# Patient Record
Sex: MALE | Race: WHITE
[De-identification: names, ages, dates, MRNs, and addresses within clinical notes are randomized per-mention and may not be internally consistent; named-entity substitution may affect disease eponyms.]

---

## 2019-06-27 ENCOUNTER — HOSPITAL ENCOUNTER (EMERGENCY)
Dept: HOSPITAL 53 - M ED | Age: 59
Discharge: HOME | End: 2019-06-27
Payer: OTHER GOVERNMENT

## 2019-06-27 VITALS — BODY MASS INDEX: 35.87 KG/M2 | WEIGHT: 250.53 LBS | HEIGHT: 70 IN

## 2019-06-27 VITALS — SYSTOLIC BLOOD PRESSURE: 148 MMHG | DIASTOLIC BLOOD PRESSURE: 80 MMHG

## 2019-06-27 DIAGNOSIS — E78.5: ICD-10-CM

## 2019-06-27 DIAGNOSIS — I82.5Z2: ICD-10-CM

## 2019-06-27 DIAGNOSIS — Z79.899: ICD-10-CM

## 2019-06-27 DIAGNOSIS — I73.9: Primary | ICD-10-CM

## 2019-06-27 DIAGNOSIS — L03.116: ICD-10-CM

## 2019-06-27 DIAGNOSIS — Z79.01: ICD-10-CM

## 2019-06-27 DIAGNOSIS — I10: ICD-10-CM

## 2019-06-27 DIAGNOSIS — I83.008: ICD-10-CM

## 2019-06-27 LAB
APTT BLD: 40.4 SECONDS (ref 25–38.4)
BASOPHILS # BLD AUTO: 0 10^3/UL (ref 0–0.2)
BASOPHILS NFR BLD AUTO: 0.3 % (ref 0–1)
BUN SERPL-MCNC: 15 MG/DL (ref 7–18)
CALCIUM SERPL-MCNC: 8.2 MG/DL (ref 8.5–10.1)
CHLORIDE SERPL-SCNC: 109 MEQ/L (ref 98–107)
CO2 SERPL-SCNC: 27 MEQ/L (ref 21–32)
CREAT SERPL-MCNC: 0.98 MG/DL (ref 0.7–1.3)
CRP SERPL-MCNC: 1.3 MG/DL (ref 0–0.3)
EOSINOPHIL # BLD AUTO: 0.1 10^3/UL (ref 0–0.5)
EOSINOPHIL NFR BLD AUTO: 1 % (ref 0–3)
ERYTHROCYTE [SEDIMENTATION RATE] IN BLOOD BY WESTERGREN METHOD: 27 MM/HR (ref 0–20)
GFR SERPL CREATININE-BSD FRML MDRD: > 60 ML/MIN/{1.73_M2} (ref 56–?)
GLUCOSE SERPL-MCNC: 174 MG/DL (ref 70–100)
HCT VFR BLD AUTO: 40.9 % (ref 42–52)
HGB BLD-MCNC: 13.9 G/DL (ref 13.5–17.5)
INR PPP: 2.62
LYMPHOCYTES # BLD AUTO: 1.4 10^3/UL (ref 1.5–4.5)
LYMPHOCYTES NFR BLD AUTO: 20 % (ref 24–44)
MCH RBC QN AUTO: 32.1 PG (ref 27–33)
MCHC RBC AUTO-ENTMCNC: 34 G/DL (ref 32–36.5)
MCV RBC AUTO: 94.5 FL (ref 80–96)
MONOCYTES # BLD AUTO: 0.6 10^3/UL (ref 0–0.8)
MONOCYTES NFR BLD AUTO: 9.2 % (ref 0–5)
NEUTROPHILS # BLD AUTO: 4.7 10^3/UL (ref 1.8–7.7)
NEUTROPHILS NFR BLD AUTO: 69.1 % (ref 36–66)
PLATELET # BLD AUTO: 278 10^3/UL (ref 150–450)
POTASSIUM SERPL-SCNC: 3.9 MEQ/L (ref 3.5–5.1)
PROTHROMBIN TIME: 27.9 SECONDS (ref 11.8–14)
RBC # BLD AUTO: 4.33 10^6/UL (ref 4.3–6.1)
SODIUM SERPL-SCNC: 141 MEQ/L (ref 136–145)
WBC # BLD AUTO: 6.8 10^3/UL (ref 4–10)

## 2019-06-27 NOTE — REP
Clinical:  Left lower extremity swelling.

 

Technique:  Real time gray scale and color Doppler evaluation using linear high

frequency transducer.

 

Findings:

Nonocclusive thrombus is identified extending from the common femoral vein to the

popliteal vein along with occlusive thrombus in the duplicated mid to distal

femoral vein.  These findings are nonspecific and the patient gives a history of

current treatment for chronic thrombus.

 

Impression:

Elements of thrombus noted from the common femoral vein to the popliteal vein

which are nonspecific in appearance and may represent chronic thrombus.  The

patient gives a history of current treatment for DVT.

 

 

Electronically Signed by

Grant Mckeon MD 06/27/2019 03:13 P

## 2019-07-01 NOTE — ED PDOC
Post-Departure Follow-Up


jad tyler faxed formal report of extrem us for fu mlg Lundborg-Gray,Maja MD           Jul 1, 2019 14:29

## 2020-01-07 ENCOUNTER — HOSPITAL ENCOUNTER (OUTPATIENT)
Dept: HOSPITAL 53 - M IRPOV | Age: 60
End: 2020-01-07
Attending: RADIOLOGY
Payer: OTHER GOVERNMENT

## 2020-01-07 VITALS — HEIGHT: 70 IN | BODY MASS INDEX: 35.85 KG/M2 | WEIGHT: 250.45 LBS

## 2020-01-07 VITALS — SYSTOLIC BLOOD PRESSURE: 158 MMHG | DIASTOLIC BLOOD PRESSURE: 74 MMHG

## 2020-01-07 DIAGNOSIS — Z79.01: ICD-10-CM

## 2020-01-07 DIAGNOSIS — Z86.718: ICD-10-CM

## 2020-01-07 DIAGNOSIS — I87.392: Primary | ICD-10-CM
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
## 2020-01-15 ENCOUNTER — HOSPITAL ENCOUNTER (OUTPATIENT)
Dept: HOSPITAL 53 - M IRPRO | Age: 60
Setting detail: OBSERVATION
LOS: 1 days | Discharge: HOME | End: 2020-01-16
Attending: RADIOLOGY | Admitting: RADIOLOGY
Payer: OTHER GOVERNMENT

## 2020-01-15 VITALS — SYSTOLIC BLOOD PRESSURE: 162 MMHG | DIASTOLIC BLOOD PRESSURE: 75 MMHG

## 2020-01-15 VITALS — DIASTOLIC BLOOD PRESSURE: 59 MMHG | SYSTOLIC BLOOD PRESSURE: 104 MMHG

## 2020-01-15 VITALS — SYSTOLIC BLOOD PRESSURE: 132 MMHG | DIASTOLIC BLOOD PRESSURE: 75 MMHG

## 2020-01-15 VITALS — DIASTOLIC BLOOD PRESSURE: 65 MMHG | SYSTOLIC BLOOD PRESSURE: 120 MMHG

## 2020-01-15 VITALS — SYSTOLIC BLOOD PRESSURE: 136 MMHG | DIASTOLIC BLOOD PRESSURE: 79 MMHG

## 2020-01-15 VITALS — SYSTOLIC BLOOD PRESSURE: 111 MMHG | DIASTOLIC BLOOD PRESSURE: 53 MMHG

## 2020-01-15 DIAGNOSIS — E34.0: Primary | ICD-10-CM

## 2020-01-15 DIAGNOSIS — I82.422: ICD-10-CM

## 2020-01-15 DIAGNOSIS — I87.392: ICD-10-CM

## 2020-01-15 DIAGNOSIS — I82.402: ICD-10-CM

## 2020-01-15 LAB
ALBUMIN SERPL BCG-MCNC: 3.8 GM/DL (ref 3.2–5.2)
ALT SERPL W P-5'-P-CCNC: 41 U/L (ref 12–78)
BILIRUB SERPL-MCNC: 0.9 MG/DL (ref 0.2–1)
BUN SERPL-MCNC: 17 MG/DL (ref 7–18)
CALCIUM SERPL-MCNC: 8.9 MG/DL (ref 8.5–10.1)
CHLORIDE SERPL-SCNC: 109 MEQ/L (ref 98–107)
CO2 SERPL-SCNC: 26 MEQ/L (ref 21–32)
CREAT SERPL-MCNC: 0.88 MG/DL (ref 0.7–1.3)
GFR SERPL CREATININE-BSD FRML MDRD: > 60 ML/MIN/{1.73_M2} (ref 56–?)
GLUCOSE SERPL-MCNC: 105 MG/DL (ref 70–100)
HCT VFR BLD AUTO: 44 % (ref 42–52)
HGB BLD-MCNC: 14.5 G/DL (ref 13.5–17.5)
INR PPP: 1.52
MCH RBC QN AUTO: 31.4 PG (ref 27–33)
MCHC RBC AUTO-ENTMCNC: 33 G/DL (ref 32–36.5)
MCV RBC AUTO: 95.2 FL (ref 80–96)
PLATELET # BLD AUTO: 220 10^3/UL (ref 150–450)
POTASSIUM SERPL-SCNC: 4.7 MEQ/L (ref 3.5–5.1)
PROT SERPL-MCNC: 6.9 GM/DL (ref 6.4–8.2)
PROTHROMBIN TIME: 18.1 SECONDS (ref 11.8–14)
RBC # BLD AUTO: 4.62 10^6/UL (ref 4.3–6.1)
SODIUM SERPL-SCNC: 141 MEQ/L (ref 136–145)
WBC # BLD AUTO: 5 10^3/UL (ref 4–10)

## 2020-01-15 RX ADMIN — SODIUM CHLORIDE SCH MLS/HR: 9 INJECTION, SOLUTION INTRAVENOUS at 22:32

## 2020-01-15 RX ADMIN — SODIUM CHLORIDE SCH MLS/HR: 9 INJECTION, SOLUTION INTRAVENOUS at 18:23

## 2020-01-15 NOTE — POST-OPPD
Postoperative Procedure Note


Date Of Procedure:  Delfino 15, 2020


Time Of Procedure:  18:30


PREOPERATIVE DIAGNOSIS:chronic left iliac occlusion 


POSTOPERATIVE DIAGNOSIS: same





FINDINGS: chronic occlusion left common iliac and external iliac





PROCEDURE: let common iliac angioplasty and stent placement





SURGEON: shruthi





ANESTHESIA: mod sed





ESTIMATED BLOOD LOSS: < 5 ml








COMPLICATIONS: none





POSTOPERATIVE CONDITION: stable











LOU COLLINS MD               Delfino 15, 2020 17:27

## 2020-01-15 NOTE — IRHP
[facility] IR Pre-Procedure H & P


General


Date of Service:  Delfino 15, 2020


Procedure:  Same Day Surgery





Interval History and Physical


I have seen the patient and reviewed last H & P performed within 30 days. 


There is no significant interval change.





History of Present Illness


Chief Complaint


The patient is a 59-year-old male admitted with a reason for visit of May 

Thsaraer.


PRE-PROCEDURE DIAGNOSIS: may thurner





HEART: normal rate.


LUNGS: normal breathing at rest.





ASA Classification


ASA Classification:  II-Mild systemic disease


Mallampati Score:  II


NPO:  Yes


Problems with prior sedation:  No


Obstructive Sleep Apnea:  No





Plan


moderate sedation





Allergies


Coded Allergies:  


     No Known Allergies (Unverified , 6/27/19)





Home Medications


Miscellaneous Medications


Atorvastatin Calcium (Atorvastatin Calcium), (Reported)


Cholecalciferol (Vitamin D3) (Vitamin D3), 1,000 UNIT PO, (Reported)


Lisinopril (Lisinopril), (Reported)


Warfarin Sodium (Warfarin Sodium), (Reported)





Discontinued Medications


Cephalexin (Keflex), 500 MG PO QID


   Discontinued Reason: Pt states not taking





VS, I&O, 24H, Fishbone


Vital Signs/I&O





Vital Signs








  Date Time  Temp Pulse Resp B/P (MAP) Pulse Ox O2 Delivery O2 Flow Rate FiO2


 


1/15/20 17:16  59 18  96 Room Air  


 


1/15/20 17:10       2 


 


1/15/20 12:45 98.5       











Laboratory Data


24H LABS


Laboratory Tests 2


1/15/20 12:43: 


Nucleated Red Blood Cells % (auto) 0.0, Prothrombin Time 18.1H, Prothromb Time 

International Ratio 1.52, Anion Gap 6L, Glomerular Filtration Rate > 60.0, 

Calcium Level 8.9, Total Bilirubin 0.9, Aspartate Amino Transf (AST/SGOT) 23, 

Alanine Aminotransferase (ALT/SGPT) 41, Alkaline Phosphatase 55, Total Protein 

6.9, Albumin 3.8, Albumin/Globulin Ratio 1.23


CBC/BMP


Laboratory Tests


1/15/20 12:43

















LOU COLLINS MD               Delfino 15, 2020 17:25

## 2020-01-16 VITALS — SYSTOLIC BLOOD PRESSURE: 142 MMHG | DIASTOLIC BLOOD PRESSURE: 76 MMHG

## 2020-01-17 NOTE — REP
IR pelvic venography.

 

IR left common iliac venography.

 

IR left common iliac vein recanalization.

 

IR left external iliac vein recanalization.

 

IR left common iliac vein stenting.

 

IR left common iliac vein angioplasty.

 

IR moderate sedation.

 

IR ultrasound guided right internal jugular vein access.

 

Clinical information:  Multiple left lower extremity deep vein thrombosis.  Left

lower extremity venous hypertension with limb swelling.  Pain.

 

Physician:  Dr. Roy.

 

Procedure:  The patient was advised of the benefits, risks and alternatives of

the procedure and informed consent was obtained.

 

The time-out was performed with verification of the patient's name MRN, site of

procedure and type of procedure to be performed.  The patient was positioned in

the supine position on the angiographic table.  The site was prepped and draped

in the usual sterile fashion.

 

Moderate sedation was performed by the physician including the presence of an

independent trained observer who assisted in monitoring the patient's level of

consciousness and physiologic status.  Following the administration of fentanyl

and Versed , the physician spent 240 minutes of continuous face to face time with

the patient.

 

A  radiograph reveals no gross abnormality.

 

Ultrasound of the right neck demonstrates patent and compressible right internal

jugular vein.

 

A micropuncture needle was used under ultrasound guidance to access the right

internal jugular vein. An 018 wire was advanced into the inferior vena cava and

the micropuncture needle was exchanged for a micro sheath.

 

An Amplatz wire was advanced through the micro sheath into the inferior vena

cava.  The micro sheath was exchanged for a 6-Tongan vascular sheath.  A 5-Tongan

MPB catheter was used in conjunction with a wire to catheterize the left common

iliac vein.

 

A venogram was performed and this demonstrates no main left common iliac vein but

cavernous transformation in the region and filling of cross pelvic collaterals to

drain via the right common iliac vein.

 

A Glidewire was advanced through the MPB catheter and under fluoroscopy guidance,

was used to recanalize through the collateral.  The glide wire was exchanged for

a Roadrunner and further manipulation was performed to try to recanalized the

collateral out to the external iliac vein.  The Roadrunner wire was exchanged for

a fathom wire and used under fluoroscopy guidance to recanalize the collateral

through to the external iliac vein.

 

A [place] catheter was advanced over the wire under fluoroscopy guidance into the

left femoral vein.

 

A venogram was performed through the catheter and this demonstrates access into

patent left femoral vein.  There is complete occlusion of the left common iliac

vein and filling of cross pelvic collaterals from the femoral vein to drain via

the right common iliac vein.

 

The catheter was advanced further over the wire and used to catheterize the

peripheral femoral vein.

 

A venogram was performed and this demonstrates patent left femoral vein.

 

The catheter was removed over the wire.

 

A 6 x 200 mm angioplasty balloon was advanced over the wire under fluoroscopy

guidance and positioned in the left common iliac/ external iliac vein.  This was

used to angioplasty the left common iliac vein.

 

The balloon was deflated and catheter removed over the wire.  The diagnostic

catheter was re-advanced over the wire under fluoroscopy guidance into the left

external iliac vein.  A repeat venogram was performed and this demonstrates

persistent occlusion of the left common iliac vein post angioplasty.  Persistent

filling of cross pelvic collaterals.

 

The right IJ sheath was exchanged over the wire for a 10-Tongan vascular sheath.

 

A 14 x 60 mm wall stent was then advanced over the wire under fluoroscopy

guidance into the left common iliac vein.  The stent was deployed under

fluoroscopy guidance.

 

Upon removing the deployment device, the wire came out of the left common iliac

vein.

 

The catheter was re-advanced over the wire in an attempt to recatheterize the

left common iliac vein.

 

After several attempts, cognizant of procedure time and radiation exposure, it

was felt best to stop and bring the patient back a different day.

 

Catheter, wire and sheath were removed, pressure held and hemostasis achieved.  A

sterile dressing was applied to the site.

 

The patient tolerated the procedure well and was returned to PRU in stable

condition.

 

EBL:  Less than 5 ml.

 

Complications:  None.

 

Conclusion:

 

 

1.  Left iliac venography demonstrates complete occlusion of the left common

iliac vein and numerous collaterals in the area along with cross pelvic

collaterals draining via the right common iliac vein.

2.  Successful recanalization of left common iliac vein out to the left femoral

vein.

3.  Successful angioplasty of the left common iliac and external iliac vein.

4.  Successful stent placement in the left common iliac vein.

 

5.  Patient to follow up in IR clinic in 2 weeks for further discussions

regarding stent extension.

 

Thank you this referral.

 

 

Electronically Signed by

Amy Roy MD 01/17/2020 10:24 A

## 2020-01-28 ENCOUNTER — HOSPITAL ENCOUNTER (OUTPATIENT)
Dept: HOSPITAL 53 - M IRPOV | Age: 60
End: 2020-01-28
Attending: RADIOLOGY
Payer: OTHER GOVERNMENT

## 2020-01-28 VITALS — WEIGHT: 250.45 LBS | BODY MASS INDEX: 35.85 KG/M2 | HEIGHT: 70 IN

## 2020-01-28 VITALS — SYSTOLIC BLOOD PRESSURE: 127 MMHG | DIASTOLIC BLOOD PRESSURE: 61 MMHG

## 2020-01-28 DIAGNOSIS — R60.0: ICD-10-CM

## 2020-01-28 DIAGNOSIS — I82.502: Primary | ICD-10-CM

## 2020-01-28 DIAGNOSIS — I87.302: ICD-10-CM

## 2020-01-29 NOTE — IRPN
[facility] IR Progress Note


IR Progress Note


DATE:  Jan 28, 2020


FOLLOW-UP: Patient with recurrent left lower extremity deep vein thrombosis, 

progressive left lower extremity swelling and advanced venous hypertension 

underwent left iliac venogram. This demonstrated left common iliac vein 

occlusion and extensive left iliac collateralization. Status post successful 

recanalization, a stent was placed in the left common iliac vein. However, 

patient will require stent extension all the way past the joint to the left 

femoral vein. We discussed the risks and benefits of stenting across the joint 

line including stent fracture. We discussed intentions would be to improve 

venous drainage from the left leg and reduce left leg swelling and venous 

hypertension. However, there is the possibility the stent can occlude in the 

future and require angioplasty/ revision.





ON EXAMINATION: Right neck access site appears well healed. No issues at access 

site. 





IMPRESSION: Patient would like to proceed with stent extension. We have 

scheduled the patient for this procedure.





Thank you for this referral





CC Dr. Boyd





Allergies


Coded Allergies:  


     No Known Allergies (Unverified , 6/27/19)





VS,Fishbone, I+O


VS, Fishbone, I+O





Vital Signs








  Date Time  Temp Pulse Resp B/P (MAP) Pulse Ox O2 Delivery O2 Flow Rate FiO2


 


1/28/20 15:20 98.3 64 18 127/61 (83) 95 Room Air  

















LOU COLLINS MD               Jan 29, 2020 08:15

## 2020-02-27 ENCOUNTER — HOSPITAL ENCOUNTER (OUTPATIENT)
Dept: HOSPITAL 53 - M IRPRO | Age: 60
End: 2020-02-27
Attending: RADIOLOGY
Payer: OTHER GOVERNMENT

## 2020-02-27 VITALS — DIASTOLIC BLOOD PRESSURE: 88 MMHG | SYSTOLIC BLOOD PRESSURE: 170 MMHG

## 2020-02-27 DIAGNOSIS — I70.92: ICD-10-CM

## 2020-02-27 DIAGNOSIS — R60.0: ICD-10-CM

## 2020-02-27 DIAGNOSIS — I87.301: Primary | ICD-10-CM

## 2020-02-27 LAB
BUN SERPL-MCNC: 22 MG/DL (ref 7–18)
CALCIUM SERPL-MCNC: 9.1 MG/DL (ref 8.5–10.1)
CHLORIDE SERPL-SCNC: 111 MEQ/L (ref 98–107)
CO2 SERPL-SCNC: 26 MEQ/L (ref 21–32)
CREAT SERPL-MCNC: 0.81 MG/DL (ref 0.7–1.3)
GFR SERPL CREATININE-BSD FRML MDRD: > 60 ML/MIN/{1.73_M2} (ref 56–?)
GLUCOSE SERPL-MCNC: 116 MG/DL (ref 70–100)
HCT VFR BLD AUTO: 40.6 % (ref 42–52)
HGB BLD-MCNC: 13.4 G/DL (ref 13.5–17.5)
INR PPP: 1.7
MCH RBC QN AUTO: 31.5 PG (ref 27–33)
MCHC RBC AUTO-ENTMCNC: 33 G/DL (ref 32–36.5)
MCV RBC AUTO: 95.3 FL (ref 80–96)
PLATELET # BLD AUTO: 201 10^3/UL (ref 150–450)
POTASSIUM SERPL-SCNC: 4.3 MEQ/L (ref 3.5–5.1)
PROTHROMBIN TIME: 19.8 SECONDS (ref 11.8–14)
RBC # BLD AUTO: 4.26 10^6/UL (ref 4.3–6.1)
SODIUM SERPL-SCNC: 143 MEQ/L (ref 136–145)
WBC # BLD AUTO: 4.9 10^3/UL (ref 4–10)

## 2020-02-27 PROCEDURE — 80048 BASIC METABOLIC PNL TOTAL CA: CPT

## 2020-02-27 PROCEDURE — 36005 INJECTION EXT VENOGRAPHY: CPT

## 2020-02-27 PROCEDURE — 36010 PLACE CATHETER IN VEIN: CPT

## 2020-02-27 PROCEDURE — 99152 MOD SED SAME PHYS/QHP 5/>YRS: CPT

## 2020-02-27 PROCEDURE — 99153 MOD SED SAME PHYS/QHP EA: CPT

## 2020-02-27 PROCEDURE — 85027 COMPLETE CBC AUTOMATED: CPT

## 2020-02-27 PROCEDURE — 85610 PROTHROMBIN TIME: CPT

## 2020-02-27 PROCEDURE — 75820 VEIN X-RAY ARM/LEG: CPT

## 2020-02-27 NOTE — IRHP
[facility] IR Pre-Procedure H & P


General


Date of Service:  Feb 27, 2020


Procedure:  Same Day Surgery





Interval History and Physical


I have seen the patient and reviewed last H & P performed within 30 days. 


There is no significant interval change.





History of Present Illness


Chief Complaint


The patient is a 59-year-old male admitted with a reason for visit of Iliac Vein

Occlusion.


PRE-PROCEDURE DIAGNOSIS: iliac vein occlusion





HEART: normal rate.


LUNGS: normal breathing at rest.





ASA Classification


ASA Classification:  II-Mild systemic disease


Mallampati Score:  II


NPO:  Yes


Problems with prior sedation:  No


Obstructive Sleep Apnea:  No





Plan


moderate sedation





Allergies


Coded Allergies:  


     No Known Allergies (Unverified , 6/27/19)





Home Medications


Miscellaneous Medications


Atorvastatin Calcium (Atorvastatin Calcium), (Reported)


Cholecalciferol (Vitamin D3) (Vitamin D3), 1,000 UNIT PO, (Reported)


Lisinopril (Lisinopril), (Reported)


Warfarin Sodium (Warfarin Sodium), (Reported)





VS, I&O, 24H, Fishbone


Vital Signs/I&O





Vital Signs








  Date Time  Temp Pulse Resp B/P (MAP) Pulse Ox O2 Delivery O2 Flow Rate FiO2


 


2/27/20 07:58 98.2 50 18  96 Room Air  











Laboratory Data


24H LABS


Laboratory Tests 2


2/27/20 07:40: 


Nucleated Red Blood Cells % (auto) 0.0, Prothrombin Time 19.8H, Prothromb Time 

International Ratio 1.70, Anion Gap 6L, Glomerular Filtration Rate > 60.0, 

Calcium Level 9.1


CBC/BMP


Laboratory Tests


2/27/20 07:40

















LOU COLLINS MD               Feb 27, 2020 09:09

## 2020-02-27 NOTE — POST-OPPD
Postoperative Procedure Note


Date Of Procedure:  Feb 27, 2020


Time Of Procedure:  14:25


PREOPERATIVE DIAGNOSIS:chronic venous occlusion





POSTOPERATIVE DIAGNOSIS: same





FINDINGS: same 





PROCEDURE: venogram. unable to catheterize to extend stents





SURGEON: shruthi








ANESTHESIA: mod sed





ESTIMATED BLOOD LOSS: < 5 ml





COMPLICATIONS: none





POSTOPERATIVE CONDITION: none











LOU COLLINS MD               Feb 27, 2020 14:26

## 2020-02-28 NOTE — REP
IR left lower extremity venography.

 

IR left common femoral venography

 

IR ultrasound guided left popliteal vein access.

 

IR ultrasound guided right internal jugular vein access.

 

IR ultrasound guided left common femoral vein access.

 

IR moderate sedation.

 

Clinical information:  Left lower extremity swelling.  Chronic venous

hypertension.  Chronic left iliac occlusion.

 

Physician:  Dr. Roy.

 

Procedure:  The patient was advised of the benefits, risks and alternatives of

the procedure and informed consent was obtained.

 

The time-out was performed with verification of the patient's name MRN, site of

procedure and type of procedure to be performed.  The patient was positioned in

the prone position on the angiographic table.  The site was prepped and draped in

the usual sterile fashion.

 

Moderate sedation was performed by the physician including the presence of an

independent trained observer who assisted in monitoring the patient's level of

consciousness and physiologic status.  Following the administration of fentanyl

and Versed , the physician spent 120 minutes of continuous face to face time with

the patient.

 

A  radiograph reveals left common iliac stent.

 

Ultrasound of the popliteal fossa demonstrates partially noncompressible left

popliteal vein.

 

The left popliteal vein was accessed under ultrasound guidance using a

micropuncture kit.

A wire was advanced into the vein.  The needle was exchanged for a micro sheath.

 

A venogram was then performed and this demonstrates intricate collaterals within

the lower thigh.  Venography further up the leg and pelvis was performed and this

demonstrates collaterals draining into one main vein which is irregular and

string -like and drains into the left external iliac vein.  There is filling of

cross pelvic collaterals and drainage via the right common iliac vein.  There is

no flow through the common iliac stent.  There is abundance of collaterals around

the external iliac vein.

 

Access was removed, pressure held and hemostasis achieved.  A sterile dressing

was applied to the site.

 

Ultrasound of the right neck demonstrates patent and compressible right internal

jugular vein.

 

A micropuncture needle was used under ultrasound guidance to access the right

internal jugular vein.  An 018 wire was advanced into the inferior vena cava and

the micropuncture needle was exchanged for a micro sheath.

 

An Amplatz wire was advanced under fluoroscopy guidance into the inferior vena

cava and the micro sheath was exchanged for a 6-Korean sheath.

 

An MPB catheter in conjunction with a Glidewire was used under fluoroscopy

guidance to try to catheterize the left iliac stent.  Failing this, the catheter

was exchanged for a Cobra catheter.  This was used under fluoroscopy guidance to

try to engage the stent.  This was removed over the wire and exchanged for Braulio

catheter.  The Bliss catheter in conjunction with the wire was used to try to

cannulate the stent.

 

Failing this , catheter wire and sheath were removed, pressure held and

hemostasis achieved.

 

Ultrasound left common femoral vein demonstrates compressible patent left common

femoral vein.  The left common femoral vein was accessed under ultrasound

guidance with a micropuncture needle.

A wire was advanced into the external iliac vein.  The needle was removed over

the wire and exchanged for a micro sheath.  An 035 wire was advanced into the

external iliac vein and the sheath was exchanged for a 6-Korean vascular sheath.

A Glidewire in conjunction with a glide cath was advanced through the sheath

under fluoroscopy guidance and used to try to recannulate the external iliac

vein.  The catheter was advanced over the wire.  A follow-up venogram was

performed and this demonstrates occlusion of the left common iliac vein.

Abundance of cross pelvic collaterals filling through the internal iliac veins to

the contralateral common iliac vein for central drainage.

 

Further attempts were made to try to recanalize the common iliac vein without

success.

 

Left groin access was removed, pressure held and hemostasis achieved.  A sterile

dressing was applied to the site.

 

The patient tolerated the procedure well and was returned to PRU in stable

condition.

 

EBL:  Less than 5 ml.

 

Complications:  None.

 

Impression:

 

Chronic left iliac vein occlusion with failed stent extension. Extremely complex

and difficult case with multiple attempts, I discussed with the patient for

possible referral to an [facility] special[facility] in IVUS guided sharp

recanalization. He is agreeable and we will refer him on.

 

Thank you this referral.

 

Cc Dr. Boyd

 

 

 

 

 

 

Electronically Signed by

Amy Roy MD 02/28/2020 01:42 P

## 2020-03-18 ENCOUNTER — HOSPITAL ENCOUNTER (OUTPATIENT)
Dept: HOSPITAL 53 - M RAD | Age: 60
End: 2020-03-18
Attending: RADIOLOGY
Payer: OTHER GOVERNMENT

## 2020-03-18 DIAGNOSIS — I87.1: Primary | ICD-10-CM

## 2020-03-18 PROCEDURE — 75635 CT ANGIO ABDOMINAL ARTERIES: CPT

## 2020-03-19 NOTE — REP
CT ANGIOGRAPHY/CT VENOGRAPHY AORTA AND LOWER EXTREMITY CIRCULATION WITH IV

CONTRAST:

 

HISTORY:  Chronic venous occlusion.

 

CT CONTRAST DOSE:  100 mL  of intravenous Isovue three CT.

 

TECHNIQUE:  Helical scanning is acquired in the arterial phase and 3 mm axial

images are reformatted.  Coronal and sagittal MPR images and MIP images are

generated and reviewed.  3D surface rendered images are generated and reviewed.

Following this, the abdomen pelvis and lower extremities were re-acquired in

delayed phase 30 seconds after the arterial phase.

 

CT ANGIOGRAPHIC FINDINGS:  There are two cortical cysts left kidney noted

incidentally, the largest of which measures 2.2 cm.  The suprarenal and

infrarenal abdominal aorta are unremarkable.  Celiac, superior mesenteric, and

inferior mesenteric artery origins are unremarkable.  Singular nonstenotic renal

arteries are seen.  There is mild calcific plaquing at the origin of the right

renal artery.  The common iliac arteries are widely patent.  External iliac

arteries are unremarkable.  The common femoral, superficial femoral, and profunda

femoral arteries are bilaterally intact.  Popliteal arteries are of good caliber.

Calf trifurcation vessels are unremarkable bilaterally.  Three-vessel calf runoff

is seen to just above the ankle and the anterior and posterior tibial arteries

are patent across the ankle bilaterally.

 

On the venous side, the acquisition delay shows a venous opacification in the

left calf in multiple superficial varicosities.  The deep veins are only

opacified in the distal calf.  There are focal linear intraluminal calcifications

in that portion of the proximal superficial femoral vein consistent with chronic

DVT.  There is diffuse enlargement and edema of the left lower extremity.

Enhancing varicosities are seen in the proximal thigh on the left.  The common

femoral vein on the left contains some linear calcific material as well.  This

extends into the left external iliac vein consistent with chronic DVT.  There is

a left common iliac vein stent in place but I cannot attest to its patency.  The

inferior vena cava in the abdomen is unremarkable.  The right common iliac vein,

right external iliac vein and more distal thigh and calf veins are unremarkable

as seen although they are not opacified.  There is no edema.  No abnormal

varicosities are seen in the right leg.

 

IMPRESSION:

 

No CT angiographic arterial stenosis or occlusion seen.  There is evidence of

chronic deep venous thrombosis and occlusion of the left lower extremity veins

probably including the left common iliac vein where there is a stent in place.

Superficial collaterals are seen throughout the left lower extremity which is

diffusely swollen.  Venous collaterals are noted in the suprapubic region

crossing the midline and into the anterior abdominal wall.  There is calcific

thrombus in the deep venous system on the left as described above at several

locations.

 

 

Electronically Signed by

Shadi Craft MD 03/19/2020 12:07 P

## 2020-04-28 ENCOUNTER — HOSPITAL ENCOUNTER (OUTPATIENT)
Dept: HOSPITAL 53 - M TMIRPOV | Age: 60
End: 2020-04-28
Attending: RADIOLOGY
Payer: OTHER GOVERNMENT

## 2020-04-28 DIAGNOSIS — I82.522: Primary | ICD-10-CM

## 2020-04-29 NOTE — IRPN
[facility] IR Progress Note


IR Progress Note


DATE:  Apr 28, 2020


FOLLOW-UP: patient with chronic complex left iliac vein occlusion and 20 year 

history of left leg swelling, status post partial recanalization. Patient was 

referred to [facility] for further IVUS guided sharp recanalization and intervention.

Patient hasn't heard from them yet due to corona pandemic however the referral 

is through. I advised the patient he will hear from [facility]. I will follow up with

the patient when his visit is confirmed.





Allergies


Coded Allergies:  


     No Known Allergies (Unverified , 6/27/19)











LOU COLLINS MD               Apr 29, 2020 15:05

## 2020-08-05 ENCOUNTER — HOSPITAL ENCOUNTER (OUTPATIENT)
Dept: HOSPITAL 53 - M LAB | Age: 60
End: 2020-08-05
Attending: NURSE PRACTITIONER
Payer: COMMERCIAL

## 2020-08-05 DIAGNOSIS — I87.009: ICD-10-CM

## 2020-08-05 DIAGNOSIS — Z53.9: Primary | ICD-10-CM

## 2020-08-13 ENCOUNTER — HOSPITAL ENCOUNTER (OUTPATIENT)
Dept: HOSPITAL 53 - M LAB | Age: 60
End: 2020-08-13
Attending: NURSE PRACTITIONER
Payer: OTHER GOVERNMENT

## 2020-08-13 DIAGNOSIS — I87.009: Primary | ICD-10-CM

## 2020-08-13 PROCEDURE — 80053 COMPREHEN METABOLIC PANEL: CPT

## 2020-08-13 PROCEDURE — 85025 COMPLETE CBC W/AUTO DIFF WBC: CPT

## 2020-08-13 PROCEDURE — 36415 COLL VENOUS BLD VENIPUNCTURE: CPT

## 2020-09-17 LAB
BASOPHILS # BLD AUTO: 0 10^3/UL (ref 0–0.2)
BASOPHILS NFR BLD AUTO: 0.4 % (ref 0–1)
EOSINOPHIL # BLD AUTO: 0.1 10^3/UL (ref 0–0.5)
EOSINOPHIL NFR BLD AUTO: 1.1 % (ref 0–3)
HCT VFR BLD AUTO: 40 % (ref 42–52)
HGB BLD-MCNC: 13.6 G/DL (ref 13.5–17.5)
LYMPHOCYTES # BLD AUTO: 1.3 10^3/UL (ref 1.5–5)
LYMPHOCYTES NFR BLD AUTO: 24.5 % (ref 24–44)
MCH RBC QN AUTO: 31.8 PG (ref 27–33)
MCHC RBC AUTO-ENTMCNC: 34 G/DL (ref 32–36.5)
MCV RBC AUTO: 93.5 FL (ref 80–96)
MONOCYTES # BLD AUTO: 0.5 10^3/UL (ref 0–0.8)
MONOCYTES NFR BLD AUTO: 9.2 % (ref 0–5)
NEUTROPHILS # BLD AUTO: 3.4 10^3/UL (ref 1.5–8.5)
NEUTROPHILS NFR BLD AUTO: 64.2 % (ref 36–66)
PLATELET # BLD AUTO: 220 10^3/UL (ref 150–450)
RBC # BLD AUTO: 4.28 10^6/UL (ref 4.3–6.1)
WBC # BLD AUTO: 5.2 10^3/UL (ref 4–10)

## 2020-09-27 LAB
ALBUMIN SERPL BCG-MCNC: 3.8 GM/DL (ref 3.2–5.2)
ALT SERPL W P-5'-P-CCNC: 53 U/L (ref 12–78)
BASOPHILS # BLD AUTO: 0 10^3/UL (ref 0–0.2)
BASOPHILS NFR BLD AUTO: 0.4 % (ref 0–1)
BILIRUB SERPL-MCNC: 0.6 MG/DL (ref 0.2–1)
BUN SERPL-MCNC: 17 MG/DL (ref 7–18)
CALCIUM SERPL-MCNC: 8.9 MG/DL (ref 8.5–10.1)
CHLORIDE SERPL-SCNC: 109 MEQ/L (ref 98–107)
CO2 SERPL-SCNC: 31 MEQ/L (ref 21–32)
CREAT SERPL-MCNC: 1.21 MG/DL (ref 0.7–1.3)
EOSINOPHIL # BLD AUTO: 0.1 10^3/UL (ref 0–0.5)
EOSINOPHIL NFR BLD AUTO: 1.1 % (ref 0–3)
GFR SERPL CREATININE-BSD FRML MDRD: > 60 ML/MIN/{1.73_M2} (ref 56–?)
GLUCOSE SERPL-MCNC: 94 MG/DL (ref 70–100)
HCT VFR BLD AUTO: 42.7 % (ref 42–52)
HGB BLD-MCNC: 14.4 G/DL (ref 13.5–17.5)
LYMPHOCYTES # BLD AUTO: 1.4 10^3/UL (ref 1.5–5)
LYMPHOCYTES NFR BLD AUTO: 24.5 % (ref 24–44)
MCH RBC QN AUTO: 31.7 PG (ref 27–33)
MCHC RBC AUTO-ENTMCNC: 33.7 G/DL (ref 32–36.5)
MCV RBC AUTO: 94.1 FL (ref 80–96)
MONOCYTES # BLD AUTO: 0.6 10^3/UL (ref 0–0.8)
MONOCYTES NFR BLD AUTO: 11.3 % (ref 0–5)
NEUTROPHILS # BLD AUTO: 3.6 10^3/UL (ref 1.5–8.5)
NEUTROPHILS NFR BLD AUTO: 62.3 % (ref 36–66)
PLATELET # BLD AUTO: 229 10^3/UL (ref 150–450)
POTASSIUM SERPL-SCNC: 4.8 MEQ/L (ref 3.5–5.1)
PROT SERPL-MCNC: 7.1 GM/DL (ref 6.4–8.2)
RBC # BLD AUTO: 4.54 10^6/UL (ref 4.3–6.1)
SODIUM SERPL-SCNC: 143 MEQ/L (ref 136–145)
WBC # BLD AUTO: 5.7 10^3/UL (ref 4–10)

## 2020-09-30 LAB
ALBUMIN SERPL BCG-MCNC: 3.6 GM/DL (ref 3.2–5.2)
ALT SERPL W P-5'-P-CCNC: 41 U/L (ref 12–78)
BILIRUB SERPL-MCNC: 0.5 MG/DL (ref 0.2–1)
BUN SERPL-MCNC: 15 MG/DL (ref 7–18)
CALCIUM SERPL-MCNC: 8.8 MG/DL (ref 8.5–10.1)
CHLORIDE SERPL-SCNC: 113 MEQ/L (ref 98–107)
CO2 SERPL-SCNC: 26 MEQ/L (ref 21–32)
CREAT SERPL-MCNC: 0.88 MG/DL (ref 0.7–1.3)
GFR SERPL CREATININE-BSD FRML MDRD: > 60 ML/MIN/{1.73_M2} (ref 56–?)
GLUCOSE SERPL-MCNC: 98 MG/DL (ref 70–100)
POTASSIUM SERPL-SCNC: 4.1 MEQ/L (ref 3.5–5.1)
PROT SERPL-MCNC: 6.7 GM/DL (ref 6.4–8.2)
SODIUM SERPL-SCNC: 144 MEQ/L (ref 136–145)

## 2020-12-01 ENCOUNTER — HOSPITAL ENCOUNTER (OUTPATIENT)
Dept: HOSPITAL 53 - M TMIRPOV | Age: 60
End: 2020-12-01
Attending: RADIOLOGY
Payer: OTHER GOVERNMENT

## 2020-12-01 DIAGNOSIS — Z48.812: Primary | ICD-10-CM

## 2020-12-04 NOTE — IRPN
[facility] IR Progress Note


IR Progress Note


DATE:  Dec 1, 2020


Patient agreed to this telephone follow-up. I spent 5 minutes in conversation 

with the patient.





FOLLOW-UP: Patient with decades long chronic left iliac occlusion and left lower

extremity venous hypertension, status post recent IVUS guided left iliac reconst

ruction at [facility]. Patient states his left leg feels much better since the 

procedure. He says that the chronic skin redness has actually gone away now. He 

is able to exercise without pain and he is using a treadmill every day. He 

denies lower back pain, dizziness or syncope. On the last one month post 

procedure CAT scan follow-up done at [facility], I was informed there was some 

pericaval hemorrhage seen. However, patient denies any pain or symptoms since 

that time.





ON EXAMINATION: No video on patient side.





IMPRESSION: Patient doing well status post IVUS guided left iliac reconstruction

for chronic iliac occlusion and left lower extremity venous hypertension. 

Patient continues to follow-up with [facility] and they will order his follow-up CAT 

scan along with follow-up clinic visit. No further follow-up scheduled with me 

unless initiated by patient and/or referring provider.





Allergies


Coded Allergies:  


     No Known Allergies (Unverified , 6/27/19)











LOU COLLINS MD                Dec 4, 2020 09:12

## 2021-05-13 ENCOUNTER — HOSPITAL ENCOUNTER (OUTPATIENT)
Dept: HOSPITAL 53 - M LABSMTC | Age: 61
End: 2021-05-13
Payer: OTHER GOVERNMENT

## 2021-05-13 DIAGNOSIS — Z20.828: Primary | ICD-10-CM

## 2021-05-13 DIAGNOSIS — Z11.59: ICD-10-CM

## 2021-06-29 ENCOUNTER — HOSPITAL ENCOUNTER (OUTPATIENT)
Dept: HOSPITAL 53 - M RAD | Age: 61
End: 2021-06-29
Attending: RADIOLOGY
Payer: OTHER GOVERNMENT

## 2021-06-29 DIAGNOSIS — M79.605: ICD-10-CM

## 2021-06-29 DIAGNOSIS — I87.2: Primary | ICD-10-CM

## 2021-06-29 NOTE — REP
INDICATION:

VENOUS INSUFFICIENCY LEFT LEG



COMPARISON:

06/27/2019



TECHNIQUE:

Gray scale and color Doppler evaluation using linear high frequency transducer.



FINDINGS:

Ultrasound examination of the left lower extremity deep venous structures from the

common femoral vein through the popliteal vein demonstrates normal compressibility

flow and wave patterns in response to respiration and augmentation.  Calf veins could

not be assessed due to body habitus and technical factors.  There is no evidence for

deep venous thrombosis.   Contralateral CFV is patent and normal.



Greater saphenous vein is thrombosed originating 1.5 cm from the common femoral vein

consistent with prior laser therapy.



IMPRESSION:

No evidence for deep venous thrombosis.





<Electronically signed by Grant Mckeon > 06/29/21 1505

## 2022-05-24 ENCOUNTER — HOSPITAL ENCOUNTER (OUTPATIENT)
Dept: HOSPITAL 53 - M IRPOV | Age: 62
End: 2022-05-24
Attending: RADIOLOGY
Payer: OTHER GOVERNMENT

## 2022-05-24 VITALS — BODY MASS INDEX: 38.73 KG/M2 | HEIGHT: 70 IN | WEIGHT: 270.55 LBS

## 2022-05-24 VITALS — SYSTOLIC BLOOD PRESSURE: 130 MMHG | DIASTOLIC BLOOD PRESSURE: 74 MMHG

## 2022-05-24 DIAGNOSIS — I87.302: Primary | ICD-10-CM

## 2022-05-24 DIAGNOSIS — I70.202: ICD-10-CM
